# Patient Record
Sex: FEMALE | Race: WHITE | URBAN - METROPOLITAN AREA
[De-identification: names, ages, dates, MRNs, and addresses within clinical notes are randomized per-mention and may not be internally consistent; named-entity substitution may affect disease eponyms.]

---

## 2024-05-23 ENCOUNTER — OFFICE VISIT (OUTPATIENT)
Age: 29
End: 2024-05-23

## 2024-05-23 VITALS
HEIGHT: 64 IN | HEART RATE: 78 BPM | SYSTOLIC BLOOD PRESSURE: 125 MMHG | OXYGEN SATURATION: 96 % | TEMPERATURE: 97.7 F | BODY MASS INDEX: 49.2 KG/M2 | DIASTOLIC BLOOD PRESSURE: 87 MMHG | WEIGHT: 288.2 LBS

## 2024-05-23 DIAGNOSIS — R10.9 ABDOMINAL CRAMPS: ICD-10-CM

## 2024-05-23 DIAGNOSIS — Z87.42 HISTORY OF PCOS: ICD-10-CM

## 2024-05-23 DIAGNOSIS — K52.9 GASTROENTERITIS: Primary | ICD-10-CM

## 2024-05-23 DIAGNOSIS — R19.7 DIARRHEA, UNSPECIFIED TYPE: ICD-10-CM

## 2024-05-23 RX ORDER — ONDANSETRON 4 MG/1
4 TABLET, ORALLY DISINTEGRATING ORAL 3 TIMES DAILY PRN
Qty: 21 TABLET | Refills: 0 | Status: SHIPPED | OUTPATIENT
Start: 2024-05-23 | End: 2024-05-23

## 2024-05-23 RX ORDER — DICYCLOMINE HCL 20 MG
20 TABLET ORAL EVERY 6 HOURS PRN
Qty: 28 TABLET | Refills: 0 | Status: SHIPPED | OUTPATIENT
Start: 2024-05-23 | End: 2024-05-30

## 2024-05-23 RX ORDER — ONDANSETRON 4 MG/1
4 TABLET, ORALLY DISINTEGRATING ORAL 3 TIMES DAILY PRN
Qty: 10 TABLET | Refills: 0 | Status: SHIPPED | OUTPATIENT
Start: 2024-05-23 | End: 2024-05-26

## 2024-05-23 ASSESSMENT — ENCOUNTER SYMPTOMS
BLOATING: 0
COUGH: 0
FLATUS: 0
ABDOMINAL PAIN: 1
VOMITING: 0
DIARRHEA: 1

## 2024-05-23 NOTE — PATIENT INSTRUCTIONS
Reassuring exam today with regard to acute diarrhea.  No symptoms or signs consistent with specific infection.  Suspect viral or food exposure cause.  Stressed importance of keeping hydrated.  Provided with antinausea to use as needed if nausea or vomiting.  Advance diet as tolerated.  Bentyl is medication to try as needed for abdominal cramping.  If fever, blood in stool, worsening abdominal pain or any new change in symptoms seek further evaluation.  Did provide with requested referral to PCP to establish care and management of chronic conditions.

## 2024-05-23 NOTE — PROGRESS NOTES
Peyton Hobson (:  1995) is a 28 y.o. female,New patient, here for evaluation of the following chief complaint(s):  Diarrhea (Diarrhea for three days)      ASSESSMENT/PLAN:  Visit Diagnoses and Associated Orders       Gastroenteritis    -  Primary    dicyclomine (BENTYL) 20 MG tablet [2420]      ondansetron (ZOFRAN-ODT) 4 MG disintegrating tablet [90993]           Diarrhea, unspecified type        ondansetron (ZOFRAN-ODT) 4 MG disintegrating tablet [50795]           Abdominal cramps        dicyclomine (BENTYL) 20 MG tablet [2420]           History of PCOS        Referral for No Primary Care Physician - Routine [FBK182 Custom]                  Reassuring exam today with regard to acute diarrhea.  No symptoms or signs consistent with specific infection.  Suspect viral or food exposure cause.  Stressed importance of keeping hydrated.  Provided with antinausea to use as needed if nausea or vomiting.  Advance diet as tolerated.  Bentyl is medication to try as needed for abdominal cramping.  If fever, blood in stool, worsening abdominal pain or any new change in symptoms seek further evaluation.  Did provide with requested referral to PCP to establish care and management of chronic conditions.      SUBJECTIVE/OBJECTIVE:      History provided by:  Patient   used: No    Diarrhea   This is a new problem. The current episode started in the past 7 days. The problem has been gradually improving. The stool consistency is described as Watery. The patient states that diarrhea does not awaken her from sleep. Associated symptoms include abdominal pain (intermittent cramping reported). Pertinent negatives include no bloating, chills, coughing, fever, headaches, increased  flatus, myalgias, sweats, URI or vomiting. Nothing aggravates the symptoms. There are no known risk factors. She has tried anti-motility drug for the symptoms. The treatment provided mild relief. There is no history of inflammatory